# Patient Record
Sex: FEMALE | Race: WHITE | NOT HISPANIC OR LATINO | Employment: FULL TIME | URBAN - METROPOLITAN AREA
[De-identification: names, ages, dates, MRNs, and addresses within clinical notes are randomized per-mention and may not be internally consistent; named-entity substitution may affect disease eponyms.]

---

## 2018-04-25 ENCOUNTER — OFFICE VISIT (OUTPATIENT)
Dept: FAMILY MEDICINE | Facility: CLINIC | Age: 38
End: 2018-04-25
Payer: COMMERCIAL

## 2018-04-25 VITALS
DIASTOLIC BLOOD PRESSURE: 78 MMHG | BODY MASS INDEX: 27.77 KG/M2 | TEMPERATURE: 99.2 F | HEART RATE: 66 BPM | OXYGEN SATURATION: 99 % | WEIGHT: 166.7 LBS | HEIGHT: 65 IN | SYSTOLIC BLOOD PRESSURE: 120 MMHG

## 2018-04-25 DIAGNOSIS — R21 RASH: Primary | ICD-10-CM

## 2018-04-25 PROCEDURE — 99203 OFFICE O/P NEW LOW 30 MIN: CPT | Performed by: PHYSICIAN ASSISTANT

## 2018-04-25 RX ORDER — BETAMETHASONE DIPROPIONATE 0.5 MG/G
CREAM TOPICAL
Qty: 20 G | Refills: 0 | Status: SHIPPED | OUTPATIENT
Start: 2018-04-25 | End: 2020-06-08 | Stop reason: ALTCHOICE

## 2018-04-25 ASSESSMENT — ENCOUNTER SYMPTOMS
JOINT SWELLING: 0
MYALGIAS: 0
DIARRHEA: 0
ABDOMINAL PAIN: 0
CHILLS: 0
SORE THROAT: 0
ARTHRALGIAS: 0
NECK STIFFNESS: 0
NAUSEA: 0
COUGH: 0
VOMITING: 0
HEADACHES: 0
FEVER: 0

## 2018-04-25 NOTE — PROGRESS NOTES
SUBJECTIVE:   Danya Ceja is a 38 year old female presenting with a chief complaint of   Chief Complaint   Patient presents with     Derm Problem     2 weeks on right thigh, has been using lotrimin         She is a new patient of [facility].    Rash    Onset of rash was 2 week(s) ago.   Course of illness is gradually worsening.  Severity mild  Current and Associated symptoms: itching   Location of the rash: right medial tigh.  Previous history of a similar rash? Yes  Recent exposure history: none known  Denies exposure to: environmental allergens, medications, new household products and new skincare products  Associated symptoms include: nothing.  Treatment measures tried include: antifungal cream    Patient has had a rash right medial thigh starting 2 weeks ago, she thought it was ringworm, she has been using Lotrimin twice daily for the past 2 weeks. No improvement, it has actually gotten worse.  The rash is occasionally itchy.  She otherwise denies any recent illness, fever, sore throat, abdominal pain, any exposure to new environmental allergens, new household products or cosmetics. No known tick bite.      Review of Systems   Constitutional: Negative for chills and fever.   HENT: Negative for sore throat.    Respiratory: Negative for cough.    Gastrointestinal: Negative for abdominal pain, diarrhea, nausea and vomiting.   Musculoskeletal: Negative for arthralgias, joint swelling, myalgias and neck stiffness.   Skin: Positive for rash.   Neurological: Negative for headaches.       Past Medical History:   Diagnosis Date     Dysmenorrhea      PONV (postoperative nausea and vomiting)     slow to awaken from anesthesia     Syncope     X3 , usu with higher altitudes     Family History   Problem Relation Age of Onset     Breast Cancer Maternal Grandmother      DIABETES Mother      Musculoskeletal Disorder Mother      unsure of name     Hypertension Mother      CANCER Father      prostate  age 50     Thyroid  "Disease Father      hyper     Family History Negative Sister      Current Outpatient Prescriptions   Medication Sig Dispense Refill     augmented betamethasone dipropionate (DIPROLENE-AF) 0.05 % cream Apply sparingly to affected area  twice daily for 14 days.  Do not apply to face. 20 g 0     amoxicillin (AMOXIL) 500 MG capsule Take 1 capsule by mouth every 8 hours. (Patient not taking: Reported on 4/25/2018) 30 capsule 0     chlorhexidine (PERIDEX) 0.12 % solution Take 15 mLs by mouth 2 times daily. (Patient not taking: Reported on 4/25/2018) 473 mL 0     HYDROcodone-acetaminophen 5-325 MG per tablet Take 1 tablet by mouth every 4 hours as needed. (Patient not taking: Reported on 4/25/2018) 30 tablet      NO ACTIVE MEDICATIONS        oxymetazoline (AFRIN) 0.05 % nasal spray Apply 2 sprays into each nare 4 times daily as needed for congestion. (Patient not taking: Reported on 4/25/2018) 1 Bottle 0     Social History   Substance Use Topics     Smoking status: Never Smoker     Smokeless tobacco: Never Used     Alcohol use Yes      Comment: rare       OBJECTIVE  /78  Pulse 66  Temp 99.2  F (37.3  C) (Oral)  Ht 5' 5\" (1.651 m)  Wt 166 lb 11.2 oz (75.6 kg)  SpO2 99%  BMI 27.74 kg/m2    Physical Exam   Constitutional: She appears well-developed and well-nourished. No distress.   HENT:   Head: Normocephalic and atraumatic.   Mouth/Throat: Oropharynx is clear and moist. No oropharyngeal exudate.   Eyes: EOM are normal.   Neck: Normal range of motion. Neck supple.   Cardiovascular: Regular rhythm and normal heart sounds.    Pulmonary/Chest: Effort normal and breath sounds normal. No respiratory distress.   Lymphadenopathy:     She has no cervical adenopathy.   Neurological: She is alert.   Skin: Skin is warm and dry. Rash noted.   Approximately a 4 cm in diameter erythematous ring with central clearing noted right upper mid-thigh not tender to palpation, not scaly, borders not raised, no excoriation marks, no " drainage, no signs of secondary bacterial infection, no fluctuance, no bite marks, skin not warm to the touch.       Labs:  No results found for this or any previous visit (from the past 24 hour(s)).        ASSESSMENT:      ICD-10-CM    1. Rash R21 augmented betamethasone dipropionate (DIPROLENE-AF) 0.05 % cream        Medical Decision Making:    Differential Diagnosis:  Atopic dermatitis  Contact dermatitis  Pityriasis rosea    Serious Comorbid Conditions:  none    PLAN:  Rash: The rash does not look like a typical tinea corporis rash. I suspect a dermatitis.  Trial of betamethasone cream.  Benadryl as needed for itching.  Follow-up if any worsening symptoms. Patient understands and agrees with the plan.      Followup:    If not improving or if condition worsens, follow up with your Primary Care Provider    Patient Instructions       Understanding Contact Dermatitis     A cool, moist compress can help reduce itching.     Contact dermatitis is a common type of skin rash. It s caused by something that touches the skin and makes it irritated and inflamed. It can occur on skin on any part of the body, such as the face, neck, hands, arms, and legs. Contact dermatitis is not spread from person to person.  Often, the reaction of contact dermatitis occurs 1 to 2 days after contact with the offending agent.  How to say it  DANA-tact [id number]   What causes contact dermatitis?  It s caused by something that irritates the skin, or that creates an allergic reaction on the skin. People can get contact dermatitis from many kinds of things. These include:    Plant oils in poison ivy, oak, and sumac    Chemicals in household , solvents, and glue    Chemicals in makeup, soap, laundry detergent, perfume, acne cream, and hair products    Certain medicines, such as neomycin, bacitracin, benzocaine, and thimerosal    Metals such as nickel, found in some jewelry and watch bands     The sticky material on the back of  bandages and tape (adhesive)    Things that can cause tiny breaks in the skin, such as wood, fiberglass, metal tools, and plant thorns    Rubber latex in surgical gloves and other medical supplies  Dermatitis can also be caused by the skin being damp for long periods of time. This can happen from washing your hands too often, or working with wet materials.  Symptoms of contact dermatitis  Symptoms can include skin that is:    Blistered    Burning    Cracked    Dry    Itchy    Painful    Red    Rough, thickened, and leathery    Swollen    Warm  The blisters may ooze fluid and form crusts.  Treatment for contact dermatitis  Treatment is done to help relieve itching and reduce inflammation. The rash should go away in a few days to a few weeks. Treatments include:    Cool, moist compress. Use a clean damp cloth. Put it on the area for 20 to 30 minutes, 5 to 6 times a day for the first 3 days.    Steroid cream or ointment. You can apply this medicine several times a day on clean skin.    Oral corticosteroid. Your healthcare provider may prescribe this medicine if you have severe skin symptoms on a large part of your body.  Your healthcare provider may give you a steroid injection instead of pills.    Oral antihistamine. This medicine can help reduce itching.    Colloidal oatmeal bath. Soaking in water with colloidal oatmeal can help soothe skin.    Plain cream, lotion, or ointment. Cream, lotion, or ointment without medicine can help to soothe and protect your skin.  Living with contact dermatitis  Talk with your healthcare provider about what may have caused your contact dermatitis. Patch testing may help you figure out what caused the rash so you can avoid further contact with it. Once you learn what caused your rash, make sure to avoid that substance. If your skin comes into contact with it again, make sure to wash your skin right away. If you can t avoid the substance, wear gloves or other protective clothing before  you touch it. Or use a cream, lotion, or ointment to protect your skin.  When to call your healthcare provider  Call your healthcare provider right away if you have any of these:    Fever of 100.4 F (38 C) or higher, or as directed    Symptoms that don t get better, or get worse    New symptoms   Date Last Reviewed: 5/1/2016 2000-2017 [employer]. [address] [place] [address], [place] [address]. All rights reserved. This information is not intended as a substitute for professional medical care. Always follow your healthcare professional's instructions.

## 2018-04-25 NOTE — MR AVS SNAPSHOT
After Visit Summary   4/25/2018    Danya Ceja    MRN: [id number]           Patient Information     Date Of Birth          1980        Visit Information        Provider Department      4/25/2018 3:15 PM Migdalia Barnes PA-C [facility]        Today's Diagnoses     Rash    -  1      Care Instructions      Understanding Contact Dermatitis     A cool, moist compress can help reduce itching.     Contact dermatitis is a common type of skin rash. It s caused by something that touches the skin and makes it irritated and inflamed. It can occur on skin on any part of the body, such as the face, neck, hands, arms, and legs. Contact dermatitis is not spread from person to person.  Often, the reaction of contact dermatitis occurs 1 to 2 days after contact with the offending agent.  How to say it  DANA-tact [id number]   What causes contact dermatitis?  It s caused by something that irritates the skin, or that creates an allergic reaction on the skin. People can get contact dermatitis from many kinds of things. These include:    Plant oils in poison ivy, oak, and sumac    Chemicals in household , solvents, and glue    Chemicals in makeup, soap, laundry detergent, perfume, acne cream, and hair products    Certain medicines, such as neomycin, bacitracin, benzocaine, and thimerosal    Metals such as nickel, found in some jewelry and watch bands     The sticky material on the back of bandages and tape (adhesive)    Things that can cause tiny breaks in the skin, such as wood, fiberglass, metal tools, and plant thorns    Rubber latex in surgical gloves and other medical supplies  Dermatitis can also be caused by the skin being damp for long periods of time. This can happen from washing your hands too often, or working with wet materials.  Symptoms of contact dermatitis  Symptoms can include skin that is:    Blistered    Burning    Cracked    Dry    Itchy    Painful    Red    Rough,  thickened, and leathery    Swollen    Warm  The blisters may ooze fluid and form crusts.  Treatment for contact dermatitis  Treatment is done to help relieve itching and reduce inflammation. The rash should go away in a few days to a few weeks. Treatments include:    Cool, moist compress. Use a clean damp cloth. Put it on the area for 20 to 30 minutes, 5 to 6 times a day for the first 3 days.    Steroid cream or ointment. You can apply this medicine several times a day on clean skin.    Oral corticosteroid. Your healthcare provider may prescribe this medicine if you have severe skin symptoms on a large part of your body.  Your healthcare provider may give you a steroid injection instead of pills.    Oral antihistamine. This medicine can help reduce itching.    Colloidal oatmeal bath. Soaking in water with colloidal oatmeal can help soothe skin.    Plain cream, lotion, or ointment. Cream, lotion, or ointment without medicine can help to soothe and protect your skin.  Living with contact dermatitis  Talk with your healthcare provider about what may have caused your contact dermatitis. Patch testing may help you figure out what caused the rash so you can avoid further contact with it. Once you learn what caused your rash, make sure to avoid that substance. If your skin comes into contact with it again, make sure to wash your skin right away. If you can t avoid the substance, wear gloves or other protective clothing before you touch it. Or use a cream, lotion, or ointment to protect your skin.  When to call your healthcare provider  Call your healthcare provider right away if you have any of these:    Fever of 100.4 F (38 C) or higher, or as directed    Symptoms that don t get better, or get worse    New symptoms   Date Last Reviewed: 5/1/2016 2000-2017 [employer]. [address] [place] [address], [place] [address]. All rights reserved. This information is not intended as a substitute for professional medical  "care. Always follow your healthcare professional's instructions.                Follow-ups after your visit        Who to contact     If you have questions or need follow up information about today's clinic visit or your schedule please contact [facility] directly at [phone].  Normal or non-critical lab and imaging results will be communicated to you by MyChart, letter or phone within 4 business days after the clinic has received the results. If you do not hear from us within 7 days, please contact the clinic through [facility] or phone. If you have a critical or abnormal lab result, we will notify you by phone as soon as possible.  Submit refill requests through [employer] or call your pharmacy and they will forward the refill request to us. Please allow 3 business days for your refill to be completed.          Additional Information About Your Visit        [employer] Information     [employer] lets you send messages to your doctor, view your test results, renew your prescriptions, schedule appointments and more. To sign up, go to [email or web address] . Click on \"Log in\" on the left side of the screen, which will take you to the Welcome page. Then click on \"Sign up Now\" on the right side of the page.     You will be asked to enter the access code listed below, as well as some personal information. Please follow the directions to create your username and password.     Your access code is: [email or web address]  Expires: 2018  2:54 PM     Your access code will  in 90 days. If you need help or a new code, please call your [facility] or [phone].        Care EveryWhere ID     This is your Care EveryWhere ID. This could be used by other organizations to access your [facility] medical records  [id number]        Your Vitals Were     Pulse Temperature Height Pulse Oximetry BMI (Body Mass Index)       66 99.2  F (37.3  C) (Oral) 5' 5\" (1.651 m) 99% 27.74 kg/m2        Blood Pressure from Last 3 " Encounters:   04/25/18 120/78   06/01/12 118/82   01/03/03 112/72    Weight from Last 3 Encounters:   04/25/18 166 lb 11.2 oz (75.6 kg)   05/30/12 138 lb 3.2 oz (62.7 kg)   01/03/03 113 lb (51.3 kg)              Today, you had the following     No orders found for display         Today's Medication Changes          These changes are accurate as of 4/25/18  3:43 PM.  If you have any questions, ask your nurse or doctor.               Start taking these medicines.        Dose/Directions    augmented betamethasone dipropionate 0.05 % cream   Commonly known as:  DIPROLENE-AF   Used for:  Rash   Started by:  Migdalia Barnes PA-C        Apply sparingly to affected area  twice daily for 14 days.  Do not apply to face.   Quantity:  20 g   Refills:  0            Where to get your medicines      These medications were sent to [facility]/pharmacy #[id number] - [place], [place] - [address]  [address] [facility] [address]     Phone:  [phone]     augmented betamethasone dipropionate 0.05 % cream                Primary Care Provider Office Phone # Fax #    Melly Bautista [phone]       [facility] [address] [facility] [address]        Equal Access to Services     ROGELIO MURRAY : Hadii malia orellana hadcosmoo Soadeel, waaxda luqadaha, qaybta kaalmada adeegyada, michael greer . So [facility] [phone].    ATENCIÓN: Si habla español, tiene a oviedo disposición servicios gratuitos de asistencia lingüística. Llame al [phone].    We comply with applicable federal civil rights laws and [place] laws. We do not discriminate on the basis of race, color, national origin, age, disability, sex, sexual orientation, or gender identity.            Thank you!     Thank you for choosing [facility]  for your care. Our goal is always to provide you with excellent care. Hearing back from our patients is one way we can continue to improve our services. Please  take a few minutes to complete the written survey that you may receive in the mail after your visit with us. Thank you!             Your Updated Medication List - Protect others around you: Learn how to safely use, store and throw away your medicines at www.disposemymeds.org.          This list is accurate as of 4/25/18  3:43 PM.  Always use your most recent med list.                   Brand Name Dispense Instructions for use Diagnosis    amoxicillin 500 MG capsule    AMOXIL    30 capsule    Take 1 capsule by mouth every 8 hours.    Maxillary hyperplasia       augmented betamethasone dipropionate 0.05 % cream    DIPROLENE-AF    20 g    Apply sparingly to affected area  twice daily for 14 days.  Do not apply to face.    Rash       chlorhexidine 0.12 % solution    PERIDEX    473 mL    Take 15 mLs by mouth 2 times daily.    Maxillary hyperplasia       HYDROcodone-acetaminophen 5-325 MG per tablet    NORCO    30 tablet    Take 1 tablet by mouth every 4 hours as needed.    Maxillary hyperplasia, Mandibular hypoplasia       NO ACTIVE MEDICATIONS           oxymetazoline 0.05 % spray    AFRIN    1 Bottle    Apply 2 sprays into each nare 4 times daily as needed for congestion.    Maxillary hyperplasia

## 2018-04-25 NOTE — PATIENT INSTRUCTIONS
Understanding Contact Dermatitis     A cool, moist compress can help reduce itching.     Contact dermatitis is a common type of skin rash. It s caused by something that touches the skin and makes it irritated and inflamed. It can occur on skin on any part of the body, such as the face, neck, hands, arms, and legs. Contact dermatitis is not spread from person to person.  Often, the reaction of contact dermatitis occurs 1 to 2 days after contact with the offending agent.  How to say it  DANA-tact [id number]-sck-OB-tis   What causes contact dermatitis?  It s caused by something that irritates the skin, or that creates an allergic reaction on the skin. People can get contact dermatitis from many kinds of things. These include:    Plant oils in poison ivy, oak, and sumac    Chemicals in household , solvents, and glue    Chemicals in makeup, soap, laundry detergent, perfume, acne cream, and hair products    Certain medicines, such as neomycin, bacitracin, benzocaine, and thimerosal    Metals such as nickel, found in some jewelry and watch bands     The sticky material on the back of bandages and tape (adhesive)    Things that can cause tiny breaks in the skin, such as wood, fiberglass, metal tools, and plant thorns    Rubber latex in surgical gloves and other medical supplies  Dermatitis can also be caused by the skin being damp for long periods of time. This can happen from washing your hands too often, or working with wet materials.  Symptoms of contact dermatitis  Symptoms can include skin that is:    Blistered    Burning    Cracked    Dry    Itchy    Painful    Red    Rough, thickened, and leathery    Swollen    Warm  The blisters may ooze fluid and form crusts.  Treatment for contact dermatitis  Treatment is done to help relieve itching and reduce inflammation. The rash should go away in a few days to a few weeks. Treatments include:    Cool, moist compress. Use a clean damp cloth. Put it on the area for 20 to 30  minutes, 5 to 6 times a day for the first 3 days.    Steroid cream or ointment. You can apply this medicine several times a day on clean skin.    Oral corticosteroid. Your healthcare provider may prescribe this medicine if you have severe skin symptoms on a large part of your body.  Your healthcare provider may give you a steroid injection instead of pills.    Oral antihistamine. This medicine can help reduce itching.    Colloidal oatmeal bath. Soaking in water with colloidal oatmeal can help soothe skin.    Plain cream, lotion, or ointment. Cream, lotion, or ointment without medicine can help to soothe and protect your skin.  Living with contact dermatitis  Talk with your healthcare provider about what may have caused your contact dermatitis. Patch testing may help you figure out what caused the rash so you can avoid further contact with it. Once you learn what caused your rash, make sure to avoid that substance. If your skin comes into contact with it again, make sure to wash your skin right away. If you can t avoid the substance, wear gloves or other protective clothing before you touch it. Or use a cream, lotion, or ointment to protect your skin.  When to call your healthcare provider  Call your healthcare provider right away if you have any of these:    Fever of 100.4 F (38 C) or higher, or as directed    Symptoms that don t get better, or get worse    New symptoms   Date Last Reviewed: 5/1/2016 2000-2017 [employer]. [address] [place] [address], [place] [address]. All rights reserved. This information is not intended as a substitute for professional medical care. Always follow your healthcare professional's instructions.

## 2018-04-25 NOTE — NURSING NOTE
"Chief Complaint   Patient presents with     Derm Problem     2 weeks on right thigh, has been using lotrimin         Initial /78  Pulse 66  Temp 99.2  F (37.3  C) (Oral)  Ht 5' 5\" (1.651 m)  Wt 166 lb 11.2 oz (75.6 kg)  SpO2 99%  BMI 27.74 kg/m2 Estimated body mass index is 27.74 kg/(m^2) as calculated from the following:    Height as of this encounter: 5' 5\" (1.651 m).    Weight as of this encounter: 166 lb 11.2 oz (75.6 kg).  Medication Reconciliation: incomplete       Kirsten Hernandez CMA      "

## 2019-08-16 ENCOUNTER — OFFICE VISIT (OUTPATIENT)
Dept: URGENT CARE | Facility: URGENT CARE | Age: 39
End: 2019-08-16
Payer: COMMERCIAL

## 2019-08-16 VITALS
WEIGHT: 160 LBS | OXYGEN SATURATION: 100 % | TEMPERATURE: 99.7 F | RESPIRATION RATE: 16 BRPM | DIASTOLIC BLOOD PRESSURE: 74 MMHG | HEART RATE: 100 BPM | BODY MASS INDEX: 26.63 KG/M2 | SYSTOLIC BLOOD PRESSURE: 126 MMHG

## 2019-08-16 DIAGNOSIS — B34.9 VIRAL SYNDROME: ICD-10-CM

## 2019-08-16 DIAGNOSIS — R50.9 FEBRILE ILLNESS, ACUTE: Primary | ICD-10-CM

## 2019-08-16 PROBLEM — R79.89 ABNORMAL TSH: Status: ACTIVE | Noted: 2018-07-18

## 2019-08-16 PROBLEM — E04.9 GOITER: Status: ACTIVE | Noted: 2018-07-18

## 2019-08-16 LAB
ALBUMIN UR-MCNC: NEGATIVE MG/DL
APPEARANCE UR: CLEAR
BILIRUB UR QL STRIP: NEGATIVE
COLOR UR AUTO: YELLOW
DEPRECATED S PYO AG THROAT QL EIA: NORMAL
GLUCOSE UR STRIP-MCNC: NEGATIVE MG/DL
HGB UR QL STRIP: NEGATIVE
KETONES UR STRIP-MCNC: NEGATIVE MG/DL
LEUKOCYTE ESTERASE UR QL STRIP: NEGATIVE
NITRATE UR QL: NEGATIVE
PH UR STRIP: 7 PH (ref 5–7)
SOURCE: NORMAL
SP GR UR STRIP: 1.01 (ref 1–1.03)
SPECIMEN SOURCE: NORMAL
UROBILINOGEN UR STRIP-ACNC: 0.2 EU/DL (ref 0.2–1)

## 2019-08-16 PROCEDURE — 87880 STREP A ASSAY W/OPTIC: CPT | Performed by: FAMILY MEDICINE

## 2019-08-16 PROCEDURE — 99214 OFFICE O/P EST MOD 30 MIN: CPT | Performed by: FAMILY MEDICINE

## 2019-08-16 PROCEDURE — 81003 URINALYSIS AUTO W/O SCOPE: CPT | Performed by: FAMILY MEDICINE

## 2019-08-16 PROCEDURE — 87081 CULTURE SCREEN ONLY: CPT | Performed by: FAMILY MEDICINE

## 2019-08-16 RX ORDER — LEVOTHYROXINE SODIUM 25 UG/1
TABLET ORAL
Refills: 3 | COMMUNITY
Start: 2018-12-31 | End: 2020-06-08 | Stop reason: ALTCHOICE

## 2019-08-16 NOTE — PROGRESS NOTES
SUBJECTIVE:   Chief Complaint   Patient presents with     Urgent Care     Headache     Started last night, no hx of headaches, went to be early and headache was very painful all through the night, took excedrin at 2am and no relief      Danya Ceja is a 39 year old female complains of fever, headache,  weakness and denies vertigo or spinning sensation     Denies cough, shortness of breath, acute respiratory illness, abdominal discomfort, nausea, vomiting, diarrhea, constipation, dysuria   Patient denies chest pain, irregular heart rhythm, , paralysis, paresthesias  Timing: sudden onset, still present and constant;  Since yesterday     denies any previous problems with similar symptoms.- no headache history,   No allergies.  No one with similar illness she has been exposed to    Past Medical History:   Diagnosis Date     Dysmenorrhea      PONV (postoperative nausea and vomiting)     slow to awaken from anesthesia     Syncope     X3 , usu with higher altitudes     Patient Active Problem List   Diagnosis     Abnormal TSH     Goiter       ALLERGIES:  No known drug allergies      Current Outpatient Medications on File Prior to Visit:  amoxicillin (AMOXIL) 500 MG capsule Take 1 capsule by mouth every 8 hours. (Patient not taking: Reported on 4/25/2018)   augmented betamethasone dipropionate (DIPROLENE-AF) 0.05 % cream Apply sparingly to affected area  twice daily for 14 days.  Do not apply to face. (Patient not taking: Reported on 8/16/2019)   chlorhexidine (PERIDEX) 0.12 % solution Take 15 mLs by mouth 2 times daily. (Patient not taking: Reported on 4/25/2018)   HYDROcodone-acetaminophen 5-325 MG per tablet Take 1 tablet by mouth every 4 hours as needed. (Patient not taking: Reported on 4/25/2018)   levothyroxine (SYNTHROID/LEVOTHROID) 25 MCG tablet TAKE 1 TABLET BY MOUTH EVERY DAY BEFORE BREAKFAST   NO ACTIVE MEDICATIONS    oxymetazoline (AFRIN) 0.05 % nasal spray Apply 2 sprays into each nare 4 times daily as needed  for congestion. (Patient not taking: Reported on 2018)     No current facility-administered medications on file prior to visit.     Social History     Tobacco Use     Smoking status: Never Smoker     Smokeless tobacco: Never Used   Substance Use Topics     Alcohol use: Yes     Comment: rare       Family History   Problem Relation Age of Onset     Breast Cancer Maternal Grandmother      Diabetes Mother      Musculoskeletal Disorder Mother         unsure of name     Hypertension Mother      Cancer Father         prostate  age 50     Thyroid Disease Father         hyper     Family History Negative Sister        ROS:  CONSTITUTIONAL:  fever, chills,    INTEGUMENTARY/SKIN: NEGATIVE for worrisome rashes,  or lesions  EYES: NEGATIVE for vision changes or irritation  RESP:NEGATIVE for significant cough or SOB  GI: NEGATIVE for nausea, abdominal pain,    change in bowel habits-  Little softer stool    OBJECTIVE:  /74   Pulse 100   Temp 99.7  F (37.6  C) (Oral)   Resp 16   Wt 72.6 kg (160 lb)   SpO2 100%   BMI 26.63 kg/m    Appears mild distress. Moving without difficulty  HEENT:  Ears  Normal ,  Mouth normal  Cranial nerves 2 through 12 grossly intact and No facial droop, no lid lag, normal facial features  NECK:  Supple. No adenopathy or masses in the neck or supraclavicular regions. Cranial nerves are normal.   EYES . PERRLA. EOM's intact.   HEART: S1 and S2 normal, no murmurs, clicks, gallops or rubs. Regular rate and rhythm.  LUNGS:  Chest is clear; no wheezes or rales. No edema or JVD.   ABDOMEN:  Soft, non-tender, normal bowel sounds, no masses, no organomegaly   NEURO: . Mental status normal. Gait and station normal.   Cerebellar function is normal.  Walks  without difficulty .  Cranial nerves grossly intact.  Sensation equal and intact in all extremities     Results for orders placed or performed in visit on 19   *UA reflex to Microscopic and Culture ([place] and [facility] (except  [place] and [place])   Result Value Ref Range    Color Urine Yellow     Appearance Urine Clear     Glucose Urine Negative NEG^Negative mg/dL    Bilirubin Urine Negative NEG^Negative    Ketones Urine Negative NEG^Negative mg/dL    Specific Gravity Urine 1.015 1.003 - 1.035    Blood Urine Negative NEG^Negative    pH Urine 7.0 5.0 - 7.0 pH    Protein Albumin Urine Negative NEG^Negative mg/dL    Urobilinogen Urine 0.2 0.2 - 1.0 EU/dL    Nitrite Urine Negative NEG^Negative    Leukocyte Esterase Urine Negative NEG^Negative    Source Midstream Urine    Rapid strep screen   Result Value Ref Range    Specimen Description Throat     Rapid Strep A Screen       NEGATIVE: No Group A streptococcal antigen detected by immunoassay, await culture report.           Assess/ Plan    Febrile illness, acute     - *UA reflex to Microscopic and Culture ([place] and [facility] (except [place] and [place])  - Rapid strep screen  - Beta strep group A culture    Viral syndrome     Patient was reassured that today's testing and physical exam show no signs of severe pathology  If worsening symptoms with with altered neurologic function,, severe unmanageable headache,  Severe neck stiffness,  chest pain and/or shortness of breath go to ER     Described that her symptoms do not seem to be severe like a meningitis.  Also discussed that many patients are presenting with similar symptoms from a viral syndrome , unknown exactly what virus-  But resolves in a few days    Rest, fluids,  Return if worsening or follow-up with primary carer

## 2019-08-17 LAB
BACTERIA SPEC CULT: NORMAL
SPECIMEN SOURCE: NORMAL

## 2020-06-08 ENCOUNTER — OFFICE VISIT (OUTPATIENT)
Dept: URGENT CARE | Facility: URGENT CARE | Age: 40
End: 2020-06-08
Payer: COMMERCIAL

## 2020-06-08 VITALS
BODY MASS INDEX: 27.26 KG/M2 | WEIGHT: 163.6 LBS | RESPIRATION RATE: 16 BRPM | OXYGEN SATURATION: 97 % | HEART RATE: 79 BPM | DIASTOLIC BLOOD PRESSURE: 70 MMHG | TEMPERATURE: 98.7 F | HEIGHT: 65 IN | SYSTOLIC BLOOD PRESSURE: 110 MMHG

## 2020-06-08 DIAGNOSIS — B35.6 TINEA CRURIS: ICD-10-CM

## 2020-06-08 DIAGNOSIS — R21 RASH: Primary | ICD-10-CM

## 2020-06-08 PROCEDURE — 99214 OFFICE O/P EST MOD 30 MIN: CPT | Performed by: PHYSICIAN ASSISTANT

## 2020-06-08 RX ORDER — CETIRIZINE HYDROCHLORIDE 10 MG/1
TABLET ORAL
Refills: 3 | COMMUNITY
Start: 2019-07-31

## 2020-06-08 RX ORDER — TRIAMCINOLONE ACETONIDE 1 MG/G
CREAM TOPICAL 2 TIMES DAILY
Qty: 15 G | Refills: 0 | Status: SHIPPED | OUTPATIENT
Start: 2020-06-08 | End: 2020-06-18

## 2020-06-08 RX ORDER — KETOCONAZOLE 20 MG/G
CREAM TOPICAL
Refills: 0 | COMMUNITY
Start: 2019-07-31 | End: 2020-06-08

## 2020-06-08 RX ORDER — KETOCONAZOLE 20 MG/G
CREAM TOPICAL
Qty: 15 G | Refills: 0 | Status: SHIPPED | OUTPATIENT
Start: 2020-06-08

## 2020-06-08 RX ORDER — LEVOTHYROXINE SODIUM 50 UG/1
TABLET ORAL
COMMUNITY
Start: 2020-04-11

## 2020-06-08 ASSESSMENT — MIFFLIN-ST. JEOR: SCORE: 1402.96

## 2020-06-08 NOTE — PROGRESS NOTES
SUBJECTIVE:  Danya is a 40 year old female who presents to urgent care with concerns for rash. They deny any facial or lip swelling, SOB, wheeze or difficulty breathing and otherwise feel well.  The rash has been present for 10 days and is quite pruritic.  She has experienced this in the past.  She gets a similar rash in appearance and characteristic 3 or 4 times per year.  She has tried to go to dermatology but has never had the lesion when she has an appointment.  Has been treated with steroids and ketoconazole in the past.  She has been putting hydrocortisone cream on this which has not been improving her symptoms.  She denies other lesions, fevers, chills, nausea, vomiting, joint or body aches.  She denies bug bites.  She feels otherwise well      Chief Complaint   Patient presents with     Derm Problem     bump right butt cheek     ROS: as stated in HPI, otherwise negative    Past Medical History:   Diagnosis Date     Dysmenorrhea      PONV (postoperative nausea and vomiting)     slow to awaken from anesthesia     Syncope     X3 , usu with higher altitudes      Social History     Socioeconomic History     Marital status: Single     Spouse name: Not on file     Number of children: Not on file     Years of education: Not on file     Highest education level: Not on file   Occupational History     Not on file   Social Needs     Financial resource strain: Not on file     Food insecurity     Worry: Not on file     Inability: Not on file     Transportation needs     Medical: Not on file     Non-medical: Not on file   Tobacco Use     Smoking status: Never Smoker     Smokeless tobacco: Never Used   Substance and Sexual Activity     Alcohol use: Yes     Comment: rare     Drug use: No     Sexual activity: Yes     Partners: Male     Birth control/protection: Condom, Pill   Lifestyle     Physical activity     Days per week: Not on file     Minutes per session: Not on file     Stress: Not on file   Relationships     Social  "connections     Talks on phone: Not on file     Gets together: Not on file     Attends [employer] service: Not on file     Active member of club or organization: Not on file     Attends meetings of clubs or organizations: Not on file     Relationship status: Not on file     Intimate partner violence     Fear of current or ex partner: Not on file     Emotionally abused: Not on file     Physically abused: Not on file     Forced sexual activity: Not on file   Other Topics Concern     Not on file   Social History Narrative     Not on file          Current Outpatient Medications:      levothyroxine (SYNTHROID/LEVOTHROID) 50 MCG tablet, , Disp: , Rfl:      cetirizine (ZYRTEC) 10 MG tablet, TAKE 1 TABLET BY MOUTH EVERY DAY AS NEEDED, Disp: , Rfl: 3     ketoconazole (NIZORAL) 2 % external cream, APPLY TWICE A DAY FOR 2 WEEKS TO RASH, Disp: , Rfl: 0     OBJECTIVE:  /70 (BP Location: Right arm, Patient Position: Chair, Cuff Size: Adult Regular)   Pulse 79   Temp 98.7  F (37.1  C) (Oral)   Resp 16   Ht 1.651 m (5' 5\")   Wt 74.2 kg (163 lb 9.6 oz)   SpO2 97%   Breastfeeding No   BMI 27.22 kg/m       SKIN:  See pic below. Circular le eduardo with erythematous advancing raised border. Non blanching non tender. On R buttocks  GENERAL APPEARANCE: Appears well and in no acute distress  HENT:  ATNC. Conjunctiva clear, EOMI.Nares Patent. No buccal or facial swelling.Throat patent with no mucous membrane involvement  NECK: Supple, non tender, no cervical adenopathy  LUNGS: No respiratory distress  CV: RRR, no murmurs, rubs or gallops, no cyanosis  NUERO: AOx3, normal mentation  PSYCH: normal mood and affect         ASSESSMENT/PLAN:  Dayna was seen today for derm problem.    Diagnoses and all orders for this visit:    Rash  -     triamcinolone (KENALOG) 0.1 % external cream; Apply topically 2 times daily for 10 days  -     DERMATOLOGY REFERRAL    Tinea cruris  -     ketoconazole (NIZORAL) 2 % external cream; APPLY TWICE A DAY " FOR 2 WEEKS TO RASH      Patient's rash is most consistent with tinea cruris.  Nummular eczema seems less likely.  Will treat with ketoconazole cream that has worked for her in the past.  Referring her to dermatology as this type of lesion appears on her skin about 4 times per year and there has not been a definitive diagnosis as of yet.  Also she was given a stronger steroid cream to start using if the ketoconazole is not improving her symptoms within a week which would indicate this is likely not fungal and may respond to a stronger steroid  We discussed signs and symptoms that would warrant further evaluation from a health care provider. The plan of care was discussed.They understand and agree with the course of treatments. A printed summary was given including instructions and medications.    Norberto Doe PA-C

## 2022-11-06 ENCOUNTER — VIRTUAL VISIT (OUTPATIENT)
Dept: URGENT CARE | Facility: CLINIC | Age: 42
End: 2022-11-06
Payer: COMMERCIAL

## 2022-11-06 DIAGNOSIS — U07.1 CLINICAL DIAGNOSIS OF COVID-19: Primary | ICD-10-CM

## 2022-11-06 PROCEDURE — 99213 OFFICE O/P EST LOW 20 MIN: CPT | Mod: CS

## 2022-11-06 NOTE — PROGRESS NOTES
"Danya is a 42 year old who is being evaluated via a billable phone visit.      Sx started Saturday.  Tested + Saturday.  Son and  also +  COVID vaccinated and boosted.  HA, fatigue, cough.  MS teacher.    Assessment & Plan     Clinical diagnosis of COVID-19    - nirmatrelvir and ritonavir (PAXLOVID) therapy pack; Take 3 tablets by mouth 2 times daily for 5 days (Take 2 Nirmatrelvir tablets and 1 Ritonavir tablet twice daily for 5 days)    COVID-19 positive patient.  Encounter for consideration of medication intervention. Patient does qualify for a prescription. Full discussion with patient including medication options, risks and benefits. Potential drug interactions reviewed with patient.     Treatment Planned Paxlovid RX sent to [facility]    Temporary change to home medications: None   None     Estimated body mass index is 27.22 kg/m  as calculated from the following:    Height as of 6/8/20: 1.651 m (5' 5\").    Weight as of 6/8/20: 74.2 kg (163 lb 9.6 oz).  No results found for: GFRESTIMATED  No results found for: [id number]    Eva Saavedra MD  [facility] VIRTUAL URGENT CARE    Subjective   Danya is a 42 year old, presenting for the following health issues:  No chief complaint on file.      HPI   Sx started Saturday.  Tested + Saturday.  Son and  also +  COVID vaccinated and boosted.  HA, fatigue, cough.  MS teacher.        Review of Systems   Constitutional, HEENT, cardiovascular, pulmonary, GI, , musculoskeletal, neuro, skin, endocrine and psych systems are negative, except as otherwise noted.      Objective           Vitals:  No vitals were obtained today due to virtual visit.    Physical Exam   GENERAL: Healthy, alert and no distress  PSYCH: mentation appears normal and affect normal/bright    Phone call duration # 10 minutes.  "

## 2023-01-04 ENCOUNTER — HOSPITAL ENCOUNTER (EMERGENCY)
Facility: CLINIC | Age: 43
Discharge: HOME | End: 2023-01-05
Attending: EMERGENCY MEDICINE | Admitting: EMERGENCY MEDICINE
Payer: COMMERCIAL

## 2023-01-04 VITALS
SYSTOLIC BLOOD PRESSURE: 154 MMHG | TEMPERATURE: 97.7 F | DIASTOLIC BLOOD PRESSURE: 79 MMHG | RESPIRATION RATE: 16 BRPM | OXYGEN SATURATION: 98 % | HEART RATE: 93 BPM

## 2023-01-04 DIAGNOSIS — H66.002 NON-RECURRENT ACUTE SUPPURATIVE OTITIS MEDIA OF LEFT EAR WITHOUT SPONTANEOUS RUPTURE OF TYMPANIC MEMBRANE: ICD-10-CM

## 2023-01-04 LAB — DEPRECATED S PYO AG THROAT QL EIA: NEGATIVE

## 2023-01-04 PROCEDURE — 250N000013 HC RX MED GY IP 250 OP 250 PS 637: Performed by: EMERGENCY MEDICINE

## 2023-01-04 PROCEDURE — 87637 SARSCOV2&INF A&B&RSV AMP PRB: CPT | Performed by: EMERGENCY MEDICINE

## 2023-01-04 PROCEDURE — 87651 STREP A DNA AMP PROBE: CPT | Performed by: EMERGENCY MEDICINE

## 2023-01-04 PROCEDURE — 99283 EMERGENCY DEPT VISIT LOW MDM: CPT | Mod: CS

## 2023-01-04 PROCEDURE — C9803 HOPD COVID-19 SPEC COLLECT: HCPCS

## 2023-01-04 RX ORDER — IBUPROFEN 600 MG/1
600 TABLET, FILM COATED ORAL ONCE
Status: COMPLETED | OUTPATIENT
Start: 2023-01-04 | End: 2023-01-04

## 2023-01-04 RX ORDER — OXYMETAZOLINE HYDROCHLORIDE 0.05 G/100ML
2 SPRAY NASAL 2 TIMES DAILY
Qty: 1 ML | Refills: 0 | Status: SHIPPED | OUTPATIENT
Start: 2023-01-04 | End: 2023-01-07

## 2023-01-04 RX ADMIN — IBUPROFEN 600 MG: 600 TABLET ORAL at 23:14

## 2023-01-05 LAB
FLUAV RNA SPEC QL NAA+PROBE: NEGATIVE
FLUBV RNA RESP QL NAA+PROBE: NEGATIVE
GROUP A STREP BY PCR: NOT DETECTED
RSV RNA SPEC NAA+PROBE: NEGATIVE
SARS-COV-2 RNA RESP QL NAA+PROBE: NEGATIVE

## 2023-01-05 ASSESSMENT — ENCOUNTER SYMPTOMS
VOMITING: 0
FEVER: 0

## 2023-01-05 NOTE — ED PROVIDER NOTES
History     Chief Complaint:  Otalgia       HPI     Danya Ceja is a 42 year old female who presents with left ear pain.  3 days ago she developed symptoms of nasal congestion, rhinorrhea and mild cough.  This evening she had the gradual onset of left ear pain which has been increasingly more uncomfortable.  She denies any drainage from the ear.  No medications were given prior to arrival.  She denies fever, vomiting, rash.  No sick contacts.    Independent Historian: Patient provides history independently      ROS:  Review of Systems   Constitutional: Negative for fever.   Gastrointestinal: Negative for vomiting.   Skin: Negative for rash.   All other systems reviewed and are negative.    Allergies:  No Known Drug Allergies     Medications:    None    Past Medical History:    Past Medical History:   Diagnosis Date     Dysmenorrhea      PONV (postoperative nausea and vomiting)      Syncope        Past Surgical History:    Past Surgical History:   Procedure Laterality Date     HC REMOVAL OF TONSILS,<9 Y/O       LE FORT ONE  5/30/2012    Procedure:LE FORT ONE; LE FORT ONE OSTEOTOMY, SAGITTAL SPLIT OSTEOTOMY, AND GENIOPLASTY ; Surgeon:MARTÍN DUBOSE; Location: OR     MOUTH SURGERY  facial jaw structure. tonsils      OSTEOTOMY SAGITTAL SPLIT  5/30/2012    Procedure:OSTEOTOMY SAGITTAL SPLIT; Surgeon:MARTÍN DUBOSE; Location: OR        Family History:    family history includes Breast Cancer in her maternal grandmother; Cancer in her father; Diabetes in her mother; Family History Negative in her sister; Hypertension in her mother; Musculoskeletal Disorder in her mother; Thyroid Disease in her father.    Social History:   reports that she has never smoked. She has never used smokeless tobacco. She reports current alcohol use. She reports that she does not use drugs.  PCP: Melly Bautista     Physical Exam     Patient Vitals for the past 24 hrs:   BP Temp Temp src Pulse Resp SpO2   01/04/23 2309 (!) 154/79 97.7   F (36.5  C) Temporal 93 16 98 %        Physical Exam    HEENT:    Right TM has scarring but no erythema, effusion or decreased light reflex     Left TM is erythematous, dull with effusion      No mastoid tenderness     Oropharynx is moist.       No tonsillar erythema, exudate or asymmetric edema.     No deviation of the uvula.     No pooling of secretions, trismus or sublingual edema.  Eyes:    Conjunctiva normal, PERRL  Neck:    Supple, no meningismus.       No pain with manipulation of the hyoid.   CV:     Regular rate and rhythm     No murmurs, rubs or gallops.    PULM:    Clear to auscultation bilateral.       No respiratory distress.       No stridor, rales or wheezing.  ABD:    Soft, non-tender, non-distended.      No splenomegaly.  MSK:     No gross deformity to all four extremities.   LYMPH:   No cervical lymphadenopathy.  NEURO:   Alert.  Normal muscular tone, no atrophy.  Skin:    Warm, dry and intact.    PSYCH:    Mood is good and affect is appropriate.        Emergency Department Course       Laboratory:  Labs Ordered and Resulted from Time of ED Arrival to Time of ED Departure   STREPTOCOCCUS A RAPID SCREEN W REFELX TO PCR - Normal       Result Value    Group A Strep antigen Negative     GROUP A STREPTOCOCCUS PCR THROAT SWAB        Emergency Department Course & Assessments:    Interventions:  Medications   ibuprofen (ADVIL/MOTRIN) tablet 600 mg (600 mg Oral Given 23)      Disposition:  The patient was discharged to home.     Impression & Plan        Medical Decision Makin-year-old female seen in the ED with 3 days of URI symptoms and now developing left ear pain.  Patient was found to have a left otitis media as a clear source for her symptoms.  No complicating factors including mastoiditis.  The remainder of her evaluation was unremarkable.  Patient placed on Augmentin twice daily for 10 days.  Afrin provided to assist with drainage of middle ear effusion.  Ibuprofen and Tylenol as  needed for pain.    Diagnosis:    ICD-10-CM    1. Non-recurrent acute suppurative otitis media of left ear without spontaneous rupture of tympanic membrane  H66.002            Discharge Medications:  Discharge Medication List as of 1/4/2023 11:44 PM      START taking these medications    Details   amoxicillin-clavulanate (AUGMENTIN) 875-125 MG tablet Take 1 tablet by mouth 2 times daily, Disp-20 tablet, R-0, [employer]      oxymetazoline (AFRIN NASAL SPRAY) 0.05 % nasal spray Spray 2 sprays in nostril 2 times daily for 3 days, Disp-1 mL, R-0, Local PrintNo drip 12 hour formula please              1/5/2023   Jaylan Saleh MD, MD  01/05/23 0033

## 2023-01-05 NOTE — RESULT ENCOUNTER NOTE
Negative for Influenza A, Influenza B, RSV and Covid19.  Patient will receive the Covid19 result via [employer] and a letter will be sent via [employer] (if active) or via the mail

## 2023-01-05 NOTE — ED TRIAGE NOTES
Pt states she has had a cold/congestion for the past 3 days. Today she comes in with excruciating pain in her left ear that started today. No known fevers. Pt took nyquil around 19:00.      Triage Assessment     Row Name 01/04/23 3253       Triage Assessment (Adult)    Airway WDL WDL       Respiratory WDL    Respiratory WDL WDL       Skin Circulation/Temperature WDL    Skin Circulation/Temperature WDL WDL       Cardiac WDL    Cardiac WDL WDL       Peripheral/Neurovascular WDL    Peripheral Neurovascular WDL WDL       Cognitive/Neuro/Behavioral WDL    Cognitive/Neuro/Behavioral WDL WDL

## 2024-10-17 RX ORDER — TRIAMCINOLONE ACETONIDE 1 MG/G
CREAM TOPICAL 3 TIMES DAILY
COMMUNITY
Start: 2024-06-08

## 2024-10-25 ENCOUNTER — ANESTHESIA EVENT (OUTPATIENT)
Dept: SURGERY | Facility: CLINIC | Age: 44
End: 2024-10-25
Payer: COMMERCIAL

## 2024-10-25 ENCOUNTER — ANESTHESIA (OUTPATIENT)
Dept: SURGERY | Facility: CLINIC | Age: 44
End: 2024-10-25
Payer: COMMERCIAL

## 2024-10-25 ENCOUNTER — HOSPITAL ENCOUNTER (OUTPATIENT)
Facility: CLINIC | Age: 44
Discharge: HOME | End: 2024-10-25
Attending: COLON & RECTAL SURGERY | Admitting: COLON & RECTAL SURGERY
Payer: COMMERCIAL

## 2024-10-25 VITALS
SYSTOLIC BLOOD PRESSURE: 121 MMHG | RESPIRATION RATE: 14 BRPM | OXYGEN SATURATION: 97 % | HEIGHT: 65 IN | BODY MASS INDEX: 29.47 KG/M2 | HEART RATE: 74 BPM | WEIGHT: 176.9 LBS | TEMPERATURE: 97.8 F | DIASTOLIC BLOOD PRESSURE: 67 MMHG

## 2024-10-25 DIAGNOSIS — K62.9 LESION OF PERIANAL AREA: Primary | ICD-10-CM

## 2024-10-25 PROCEDURE — 250N000011 HC RX IP 250 OP 636: Performed by: NURSE ANESTHETIST, CERTIFIED REGISTERED

## 2024-10-25 PROCEDURE — 250N000009 HC RX 250: Performed by: COLON & RECTAL SURGERY

## 2024-10-25 PROCEDURE — 258N000003 HC RX IP 258 OP 636: Performed by: ANESTHESIOLOGY

## 2024-10-25 PROCEDURE — 360N000075 HC SURGERY LEVEL 2, PER MIN: Performed by: COLON & RECTAL SURGERY

## 2024-10-25 PROCEDURE — 88305 TISSUE EXAM BY PATHOLOGIST: CPT | Mod: TC | Performed by: COLON & RECTAL SURGERY

## 2024-10-25 PROCEDURE — 250N000009 HC RX 250: Performed by: NURSE ANESTHETIST, CERTIFIED REGISTERED

## 2024-10-25 PROCEDURE — 999N000141 HC STATISTIC PRE-PROCEDURE NURSING ASSESSMENT: Performed by: COLON & RECTAL SURGERY

## 2024-10-25 PROCEDURE — 710N000012 HC RECOVERY PHASE 2, PER MINUTE: Performed by: COLON & RECTAL SURGERY

## 2024-10-25 PROCEDURE — 272N000001 HC OR GENERAL SUPPLY STERILE: Performed by: COLON & RECTAL SURGERY

## 2024-10-25 PROCEDURE — 250N000013 HC RX MED GY IP 250 OP 250 PS 637: Performed by: COLON & RECTAL SURGERY

## 2024-10-25 PROCEDURE — 88305 TISSUE EXAM BY PATHOLOGIST: CPT | Mod: 26 | Performed by: PATHOLOGY

## 2024-10-25 PROCEDURE — 370N000017 HC ANESTHESIA TECHNICAL FEE, PER MIN: Performed by: COLON & RECTAL SURGERY

## 2024-10-25 RX ORDER — DIPHENHYDRAMINE HYDROCHLORIDE 50 MG/ML
25 INJECTION INTRAMUSCULAR; INTRAVENOUS EVERY 6 HOURS PRN
Status: DISCONTINUED | OUTPATIENT
Start: 2024-10-25 | End: 2024-10-25 | Stop reason: HOSPADM

## 2024-10-25 RX ORDER — FENTANYL CITRATE 50 UG/ML
50 INJECTION, SOLUTION INTRAMUSCULAR; INTRAVENOUS EVERY 5 MIN PRN
Status: DISCONTINUED | OUTPATIENT
Start: 2024-10-25 | End: 2024-10-25 | Stop reason: HOSPADM

## 2024-10-25 RX ORDER — DIAZEPAM 10 MG/2ML
2.5 INJECTION, SOLUTION INTRAMUSCULAR; INTRAVENOUS
Status: DISCONTINUED | OUTPATIENT
Start: 2024-10-25 | End: 2024-10-25 | Stop reason: HOSPADM

## 2024-10-25 RX ORDER — OXYCODONE HYDROCHLORIDE 5 MG/1
5 TABLET ORAL
Status: DISCONTINUED | OUTPATIENT
Start: 2024-10-25 | End: 2024-10-25

## 2024-10-25 RX ORDER — LABETALOL HYDROCHLORIDE 5 MG/ML
10 INJECTION, SOLUTION INTRAVENOUS
Status: DISCONTINUED | OUTPATIENT
Start: 2024-10-25 | End: 2024-10-25 | Stop reason: HOSPADM

## 2024-10-25 RX ORDER — HYDROMORPHONE HCL IN WATER/PF 6 MG/30 ML
0.2 PATIENT CONTROLLED ANALGESIA SYRINGE INTRAVENOUS EVERY 5 MIN PRN
Status: DISCONTINUED | OUTPATIENT
Start: 2024-10-25 | End: 2024-10-25 | Stop reason: HOSPADM

## 2024-10-25 RX ORDER — ALBUTEROL SULFATE 0.83 MG/ML
2.5 SOLUTION RESPIRATORY (INHALATION) EVERY 4 HOURS PRN
Status: DISCONTINUED | OUTPATIENT
Start: 2024-10-25 | End: 2024-10-25 | Stop reason: HOSPADM

## 2024-10-25 RX ORDER — ONDANSETRON 2 MG/ML
4 INJECTION INTRAMUSCULAR; INTRAVENOUS EVERY 30 MIN PRN
Status: DISCONTINUED | OUTPATIENT
Start: 2024-10-25 | End: 2024-10-25 | Stop reason: HOSPADM

## 2024-10-25 RX ORDER — ACETAMINOPHEN 325 MG/1
975 TABLET ORAL ONCE
Status: DISCONTINUED | OUTPATIENT
Start: 2024-10-25 | End: 2024-10-25 | Stop reason: HOSPADM

## 2024-10-25 RX ORDER — DEXAMETHASONE SODIUM PHOSPHATE 4 MG/ML
INJECTION, SOLUTION INTRA-ARTICULAR; INTRALESIONAL; INTRAMUSCULAR; INTRAVENOUS; SOFT TISSUE PRN
Status: DISCONTINUED | OUTPATIENT
Start: 2024-10-25 | End: 2024-10-25

## 2024-10-25 RX ORDER — SODIUM CHLORIDE, SODIUM LACTATE, POTASSIUM CHLORIDE, CALCIUM CHLORIDE 600; 310; 30; 20 MG/100ML; MG/100ML; MG/100ML; MG/100ML
INJECTION, SOLUTION INTRAVENOUS CONTINUOUS
Status: DISCONTINUED | OUTPATIENT
Start: 2024-10-25 | End: 2024-10-25 | Stop reason: HOSPADM

## 2024-10-25 RX ORDER — LIDOCAINE HYDROCHLORIDE 20 MG/ML
INJECTION, SOLUTION INFILTRATION; PERINEURAL PRN
Status: DISCONTINUED | OUTPATIENT
Start: 2024-10-25 | End: 2024-10-25

## 2024-10-25 RX ORDER — KETOROLAC TROMETHAMINE 30 MG/ML
INJECTION, SOLUTION INTRAMUSCULAR; INTRAVENOUS PRN
Status: DISCONTINUED | OUTPATIENT
Start: 2024-10-25 | End: 2024-10-25

## 2024-10-25 RX ORDER — LIDOCAINE 40 MG/G
CREAM TOPICAL
Status: DISCONTINUED | OUTPATIENT
Start: 2024-10-25 | End: 2024-10-25 | Stop reason: HOSPADM

## 2024-10-25 RX ORDER — ONDANSETRON 4 MG/1
4 TABLET, ORALLY DISINTEGRATING ORAL EVERY 30 MIN PRN
Status: DISCONTINUED | OUTPATIENT
Start: 2024-10-25 | End: 2024-10-25 | Stop reason: HOSPADM

## 2024-10-25 RX ORDER — DEXAMETHASONE SODIUM PHOSPHATE 4 MG/ML
4 INJECTION, SOLUTION INTRA-ARTICULAR; INTRALESIONAL; INTRAMUSCULAR; INTRAVENOUS; SOFT TISSUE
Status: DISCONTINUED | OUTPATIENT
Start: 2024-10-25 | End: 2024-10-25 | Stop reason: HOSPADM

## 2024-10-25 RX ORDER — NALOXONE HYDROCHLORIDE 0.4 MG/ML
0.1 INJECTION, SOLUTION INTRAMUSCULAR; INTRAVENOUS; SUBCUTANEOUS
Status: DISCONTINUED | OUTPATIENT
Start: 2024-10-25 | End: 2024-10-25 | Stop reason: HOSPADM

## 2024-10-25 RX ORDER — BUPIVACAINE HYDROCHLORIDE AND EPINEPHRINE 2.5; 5 MG/ML; UG/ML
INJECTION, SOLUTION EPIDURAL; INFILTRATION; INTRACAUDAL; PERINEURAL PRN
Status: DISCONTINUED | OUTPATIENT
Start: 2024-10-25 | End: 2024-10-25 | Stop reason: HOSPADM

## 2024-10-25 RX ORDER — ACETAMINOPHEN 325 MG/1
975 TABLET ORAL ONCE
Status: COMPLETED | OUTPATIENT
Start: 2024-10-25 | End: 2024-10-25

## 2024-10-25 RX ORDER — OXYCODONE HYDROCHLORIDE 5 MG/1
5 TABLET ORAL
Status: DISCONTINUED | OUTPATIENT
Start: 2024-10-25 | End: 2024-10-25 | Stop reason: HOSPADM

## 2024-10-25 RX ORDER — GLYCOPYRROLATE 0.2 MG/ML
INJECTION, SOLUTION INTRAMUSCULAR; INTRAVENOUS PRN
Status: DISCONTINUED | OUTPATIENT
Start: 2024-10-25 | End: 2024-10-25

## 2024-10-25 RX ORDER — ONDANSETRON 2 MG/ML
INJECTION INTRAMUSCULAR; INTRAVENOUS PRN
Status: DISCONTINUED | OUTPATIENT
Start: 2024-10-25 | End: 2024-10-25

## 2024-10-25 RX ORDER — OXYCODONE HYDROCHLORIDE 5 MG/1
10 TABLET ORAL
Status: DISCONTINUED | OUTPATIENT
Start: 2024-10-25 | End: 2024-10-25 | Stop reason: HOSPADM

## 2024-10-25 RX ORDER — OXYCODONE HYDROCHLORIDE 5 MG/1
5 TABLET ORAL EVERY 4 HOURS PRN
Qty: 6 TABLET | Refills: 0 | Status: SHIPPED | OUTPATIENT
Start: 2024-10-25

## 2024-10-25 RX ORDER — PROPOFOL 10 MG/ML
INJECTION, EMULSION INTRAVENOUS CONTINUOUS PRN
Status: DISCONTINUED | OUTPATIENT
Start: 2024-10-25 | End: 2024-10-25

## 2024-10-25 RX ORDER — FENTANYL CITRATE 50 UG/ML
25 INJECTION, SOLUTION INTRAMUSCULAR; INTRAVENOUS EVERY 5 MIN PRN
Status: DISCONTINUED | OUTPATIENT
Start: 2024-10-25 | End: 2024-10-25 | Stop reason: HOSPADM

## 2024-10-25 RX ORDER — MAGNESIUM HYDROXIDE 1200 MG/15ML
LIQUID ORAL PRN
Status: DISCONTINUED | OUTPATIENT
Start: 2024-10-25 | End: 2024-10-25 | Stop reason: HOSPADM

## 2024-10-25 RX ORDER — HYDROMORPHONE HCL IN WATER/PF 6 MG/30 ML
0.4 PATIENT CONTROLLED ANALGESIA SYRINGE INTRAVENOUS EVERY 5 MIN PRN
Status: DISCONTINUED | OUTPATIENT
Start: 2024-10-25 | End: 2024-10-25 | Stop reason: HOSPADM

## 2024-10-25 RX ORDER — HYDRALAZINE HYDROCHLORIDE 20 MG/ML
2.5-5 INJECTION INTRAMUSCULAR; INTRAVENOUS EVERY 10 MIN PRN
Status: DISCONTINUED | OUTPATIENT
Start: 2024-10-25 | End: 2024-10-25 | Stop reason: HOSPADM

## 2024-10-25 RX ORDER — FENTANYL CITRATE 50 UG/ML
INJECTION, SOLUTION INTRAMUSCULAR; INTRAVENOUS PRN
Status: DISCONTINUED | OUTPATIENT
Start: 2024-10-25 | End: 2024-10-25

## 2024-10-25 RX ORDER — DIPHENHYDRAMINE HCL 25 MG
25 CAPSULE ORAL EVERY 6 HOURS PRN
Status: DISCONTINUED | OUTPATIENT
Start: 2024-10-25 | End: 2024-10-25 | Stop reason: HOSPADM

## 2024-10-25 RX ADMIN — ONDANSETRON 4 MG: 2 INJECTION INTRAMUSCULAR; INTRAVENOUS at 12:46

## 2024-10-25 RX ADMIN — LIDOCAINE HYDROCHLORIDE 50 MG: 20 INJECTION, SOLUTION INFILTRATION; PERINEURAL at 12:46

## 2024-10-25 RX ADMIN — FENTANYL CITRATE 100 MCG: 50 INJECTION INTRAMUSCULAR; INTRAVENOUS at 12:46

## 2024-10-25 RX ADMIN — DEXAMETHASONE SODIUM PHOSPHATE 4 MG: 4 INJECTION, SOLUTION INTRA-ARTICULAR; INTRALESIONAL; INTRAMUSCULAR; INTRAVENOUS; SOFT TISSUE at 12:46

## 2024-10-25 RX ADMIN — SODIUM CHLORIDE, POTASSIUM CHLORIDE, SODIUM LACTATE AND CALCIUM CHLORIDE: 600; 310; 30; 20 INJECTION, SOLUTION INTRAVENOUS at 10:57

## 2024-10-25 RX ADMIN — GLYCOPYRROLATE 0.2 MG: 0.2 INJECTION, SOLUTION INTRAMUSCULAR; INTRAVENOUS at 12:46

## 2024-10-25 RX ADMIN — MIDAZOLAM 2 MG: 1 INJECTION INTRAMUSCULAR; INTRAVENOUS at 12:46

## 2024-10-25 RX ADMIN — KETOROLAC TROMETHAMINE 15 MG: 30 INJECTION, SOLUTION INTRAMUSCULAR at 13:12

## 2024-10-25 RX ADMIN — PROPOFOL 150 MCG/KG/MIN: 10 INJECTION, EMULSION INTRAVENOUS at 12:46

## 2024-10-25 RX ADMIN — ACETAMINOPHEN 975 MG: 325 TABLET, FILM COATED ORAL at 10:55

## 2024-10-25 ASSESSMENT — ACTIVITIES OF DAILY LIVING (ADL)
ADLS_ACUITY_SCORE: 0

## 2024-10-25 NOTE — ANESTHESIA CARE TRANSFER NOTE
Patient: Danya Ceja    Procedure: Procedure(s):  Exam under anesthesia of the rectum  Excision of perianal lesion       Diagnosis: Perianal lesion [K62.9]  Diagnosis Additional Information: No value filed.    Anesthesia Type:   MAC     Note:    Oropharynx: oropharynx clear of all foreign objects and spontaneously breathing  Level of Consciousness: awake  Oxygen Supplementation: room air    Independent Airway: airway patency satisfactory and stable  Dentition: dentition unchanged  Vital Signs Stable: post-procedure vital signs reviewed and stable  Report to RN Given: handoff report given  Patient transferred to: Phase II    Handoff Report: Identifed the Patient, Identified the Reponsible Provider, Reviewed the pertinent medical history, Discussed the surgical course, Reviewed Intra-OP anesthesia mangement and issues during anesthesia, Set expectations for post-procedure period and Allowed opportunity for questions and acknowledgement of understanding      Vitals:  Vitals Value Taken Time   /65 10/25/24 1321   Temp     Pulse 92 10/25/24 1321   Resp     SpO2 93 % 10/25/24 1322   Vitals shown include unfiled device data.    Electronically Signed By: SYEDA Bajwa CRNA  October 25, 2024  1:23 PM

## 2024-10-25 NOTE — ANESTHESIA POSTPROCEDURE EVALUATION
Patient: Danya Ceja    Procedure: Procedure(s):  Exam under anesthesia of the rectum  Excision of perianal lesion       Anesthesia Type:  MAC    Note:  Disposition: Outpatient   Postop Pain Control: Uneventful            Sign Out: Well controlled pain   PONV: No   Neuro/Psych: Uneventful            Sign Out: Acceptable/Baseline neuro status   Airway/Respiratory: Uneventful            Sign Out: Acceptable/Baseline resp. status   CV/Hemodynamics: Uneventful            Sign Out: Acceptable CV status; No obvious hypovolemia; No obvious fluid overload   Other NRE: NONE   DID A NON-ROUTINE EVENT OCCUR? No           Last vitals:  Vitals Value Taken Time   BP 98/60 10/25/24 1330   Temp 97.8  F (36.6  C) 10/25/24 1328   Pulse 90 10/25/24 1330   Resp     SpO2 93 % 10/25/24 1338   Vitals shown include unfiled device data.    Electronically Signed By: Jluis Hendrix MD  October 25, 2024  1:44 PM

## 2024-10-25 NOTE — DISCHARGE INSTRUCTIONS
You received Toradol, an IV form of Ibuprofen (Motrin) at 1:15 pm.  Do not take any Ibuprofen products until 7:15 pm.    Maximum acetaminophen (Tylenol) dose from all sources should not exceed 4 grams (4000 mg) per day. You received 975 mg at 11 am.       DR. BRADLEY SHIPMAN M.D.  [facility] PHONE NUMBER:  [phone]  [facility]

## 2024-10-25 NOTE — ANESTHESIA PREPROCEDURE EVALUATION
Anesthesia Pre-Procedure Evaluation    Patient: Danya Ceja   MRN: [id number] : 1980        Procedure : Procedure(s):  Exam under anesthesia of the rectum  Excision of perianal lesion          Past Medical History:   Diagnosis Date    Dysmenorrhea     PONV (postoperative nausea and vomiting)     slow to awaken from anesthesia    Syncope     X3 , usu with higher altitudes      Past Surgical History:   Procedure Laterality Date    COLONOSCOPY      HC REMOVAL OF TONSILS,<9 Y/O      LE FORT ONE  2012    Procedure:LE FORT ONE; LE FORT ONE OSTEOTOMY, SAGITTAL SPLIT OSTEOTOMY, AND GENIOPLASTY ; Surgeon:MARTÍN DUBOSE; Location: OR    MOUTH SURGERY  facial jaw structure. tonsils     OSTEOTOMY SAGITTAL SPLIT  2012    Procedure:OSTEOTOMY SAGITTAL SPLIT; Surgeon:MARTÍN DUBOSE; Location: OR      Allergies   Allergen Reactions    No Known Drug Allergy       Social History     Tobacco Use    Smoking status: Never    Smokeless tobacco: Never   Substance Use Topics    Alcohol use: Yes     Comment: rare      Wt Readings from Last 1 Encounters:   20 74.2 kg (163 lb 9.6 oz)        Anesthesia Evaluation   Pt has had prior anesthetic. Type: General.    History of anesthetic complications  - PONV.      ROS/MED HX  ENT/Pulmonary:  - neg pulmonary ROS     Neurologic:  - neg neurologic ROS     Cardiovascular: Comment: Described with syncope at higher altitudes    (+)  - -   -  - -             fainting (syncope).                         METS/Exercise Tolerance:     Hematologic:  - neg hematologic  ROS     Musculoskeletal:  - neg musculoskeletal ROS     GI/Hepatic:  - neg GI/hepatic ROS     Renal/Genitourinary:  - neg Renal ROS     Endo: Comment: Class 1 obesity    (+)          thyroid problem, nodular disease,    Obesity,       Psychiatric/Substance Use:  - neg psychiatric ROS     Infectious Disease:  - neg infectious disease ROS     Malignancy:  - neg malignancy ROS     Other:  - neg other ROS     "      Physical Exam    Airway        Mallampati: II   TM distance: > 3 FB   Neck ROM: full   Mouth opening: > 3 cm    Respiratory Devices and Support         Dental           Cardiovascular   cardiovascular exam normal       Rhythm and rate: regular and normal     Pulmonary           breath sounds clear to auscultation       Other findings: No lab results found.   No lab results found.          OUTSIDE LABS:  CBC:   Lab Results   Component Value Date    HGB 14.6 01/04/2003     BMP:   Lab Results   Component Value Date     01/04/2003    POTASSIUM 4.7 01/04/2003    CHLORIDE 103 01/04/2003    CO2 28 01/04/2003    BUN 11 01/04/2003    CR 0.7 01/04/2003    GLC 88 01/04/2003     COAGS: No results found for: \"PTT\", \"INR\", \"FIBR\"  POC:   Lab Results   Component Value Date     (H) 05/31/2012    HCG Negative 05/30/2012     HEPATIC:   Lab Results   Component Value Date    ALT 25 01/04/2003     OTHER:   Lab Results   Component Value Date    JUMANA 9.4 01/04/2003       Anesthesia Plan    ASA Status:  2    NPO Status:  NPO Appropriate    Anesthesia Type: MAC.     - Reason for MAC: straight local not clinically adequate, immobility needed   Induction: Intravenous.   Maintenance: TIVA.        Consents    Anesthesia Plan(s) and associated risks, benefits, and realistic alternatives discussed. Questions answered and patient/representative(s) expressed understanding.     - Discussed: Risks, Benefits and Alternatives for BOTH SEDATION and the PROCEDURE were discussed     - Discussed with:  Patient      - Extended Intubation/Ventilatory Support Discussed: No.      - Patient is DNR/DNI Status: No     Use of blood products discussed: No .     Postoperative Care    Pain management: IV analgesics, Oral pain medications, Peripheral nerve block (Single Shot), Multi-modal analgesia.   PONV prophylaxis: Ondansetron (or other 5HT-3), Background Propofol Infusion     Comments:               Jluis Hendrix MD    I have reviewed the " "pertinent notes and labs in the chart from the past 30 days and (re)examined the patient.  Any updates or changes from those notes are reflected in this note.                       # Obesity: Estimated body mass index is 30.12 kg/m  as calculated from the following:    Height as of this encounter: 1.651 m (5' 5\").    Weight as of this encounter: 82.1 kg (181 lb).             "

## 2024-10-25 NOTE — OP NOTE
OPERATIVE NOTE    PERIOPERATIVE DIAGNOSIS: Left posterior perianal nodule  POSTOPERATIVE DIAGNOSIS: Same     PROCEDURE: Excision of left posterior perianal nodule     SURGEON:  Kathrin Story MD    CO-SURGEON: None     ANESTHESIA: Monitored anesthesia care and 30 cc of 1% lidocaine with sodium bicarb and epinephrine, and 30 cc of quarter percent Marcaine with 1-200,000 epinephrine     INDICATIONS FOR PROCEDURE: Danya is a 44-year-old woman whose had a perianal lesion excised several times in the past.  This is bothersome for her in terms of hygiene and irritation.  We discussed the role for an exam under anesthesia and excision versus possible drainage.  Reviewed the risk of bleeding, infection, alteration of bowel control, recurrence, and expected recovery.  She understood and wished to proceed.     FINDINGS: There was a roughly 1x 0.5 x 0.5 cm left posterior nodule that was subcutaneous.  This appeared to be encapsulated and not appear to be infected or involving the sphincter complex.  The wound was closed primarily.     DESCRIPTION OF PROCEDURE: After informed consent was obtained patient was taken the operating room positioned on the operating table in the prone jackknife position and was sedated.  Perianal region was taped prepped and draped usual fashion.  After appropriate timeout began inspecting the area revealing the palpable nodule.  This was marked with a marking pen.  I injected 30 cc of 1% lidocaine in the subcutaneous tissues as well as the deeper tissues.  I then injected 15 cc of Marcaine on each side of of the anus in the subcutaneous and deeper tissues planes for block.    I then used a 15 blade to create an ellipse around this lesion and use Metzenbaum scissors to elevate the lesion in tact with its capsule of the deeper tissues.  Point cautery was then used to control bleeding that was minimal.  Some deep dermal 3-0 Vicryl sutures were placed.  Several interrupted 3-0 Vicryl sutures were placed  on the skin.    Sponge needle and instrument counts were correct at the end the case.    Estimated blood loss less than 2 cc.    COMPLICATIONS: No immediate complications    SPECIMENS: Left posterior subcutaneous nodule     Kathrin Story MD

## 2024-10-30 LAB
PATH REPORT.COMMENTS IMP SPEC: NORMAL
PATH REPORT.COMMENTS IMP SPEC: NORMAL
PATH REPORT.FINAL DX SPEC: NORMAL
PATH REPORT.GROSS SPEC: NORMAL
PATH REPORT.MICROSCOPIC SPEC OTHER STN: NORMAL
PATH REPORT.RELEVANT HX SPEC: NORMAL
PHOTO IMAGE: NORMAL

## (undated) DEVICE — LINEN HALF SHEET 5512

## (undated) DEVICE — LINEN FULL SHEET 5511

## (undated) DEVICE — LINEN TOWEL PACK X10 5473

## (undated) DEVICE — SOL NACL 0.9% IRRIG 1000ML BOTTLE 2F7124

## (undated) DEVICE — GLOVE BIOGEL PI MICRO SZ 7.0 48570

## (undated) DEVICE — DRAPE LAP PEDS DISP 29492

## (undated) DEVICE — PREP POVIDONE IODINE SOLUTION 10% 4OZ BOTTLE 29906-004

## (undated) DEVICE — SYR 05ML LL W/O NDL

## (undated) DEVICE — PACK MINOR CUSTOM RIDGES SBA32RMRMA

## (undated) DEVICE — TAPE DURAPORE 3" SILK 1538-3

## (undated) DEVICE — PANTIES MESH LG/XLG 2PK 706M2

## (undated) DEVICE — SU VICRYL 3-0 SH 27" J316H

## (undated) DEVICE — ESU GROUND PAD ADULT W/CORD E7507

## (undated) DEVICE — TRAY PREP DRY SKIN SCRUB 067

## (undated) DEVICE — BAG CLEAR TRASH 1.3M 39X33" P4040C

## (undated) DEVICE — TUBING SUCTION 12"X1/4" N612

## (undated) DEVICE — NDL 25GA 1.5" 305127

## (undated) DEVICE — PAD PERI INDIV WRAP 11" 2022A

## (undated) RX ORDER — FENTANYL CITRATE 50 UG/ML
INJECTION, SOLUTION INTRAMUSCULAR; INTRAVENOUS
Status: DISPENSED
Start: 2024-10-25

## (undated) RX ORDER — GLYCOPYRROLATE 0.2 MG/ML
INJECTION, SOLUTION INTRAMUSCULAR; INTRAVENOUS
Status: DISPENSED
Start: 2024-10-25

## (undated) RX ORDER — LIDOCAINE HYDROCHLORIDE 10 MG/ML
INJECTION, SOLUTION EPIDURAL; INFILTRATION; INTRACAUDAL; PERINEURAL
Status: DISPENSED
Start: 2024-10-25

## (undated) RX ORDER — DEXAMETHASONE SODIUM PHOSPHATE 4 MG/ML
INJECTION, SOLUTION INTRA-ARTICULAR; INTRALESIONAL; INTRAMUSCULAR; INTRAVENOUS; SOFT TISSUE
Status: DISPENSED
Start: 2024-10-25

## (undated) RX ORDER — ACETAMINOPHEN 325 MG/1
TABLET ORAL
Status: DISPENSED
Start: 2024-10-25

## (undated) RX ORDER — PROPOFOL 10 MG/ML
INJECTION, EMULSION INTRAVENOUS
Status: DISPENSED
Start: 2024-10-25

## (undated) RX ORDER — ONDANSETRON 2 MG/ML
INJECTION INTRAMUSCULAR; INTRAVENOUS
Status: DISPENSED
Start: 2024-10-25

## (undated) RX ORDER — BUPIVACAINE HYDROCHLORIDE AND EPINEPHRINE 2.5; 5 MG/ML; UG/ML
INJECTION, SOLUTION EPIDURAL; INFILTRATION; INTRACAUDAL; PERINEURAL
Status: DISPENSED
Start: 2024-10-25